# Patient Record
Sex: MALE | URBAN - METROPOLITAN AREA
[De-identification: names, ages, dates, MRNs, and addresses within clinical notes are randomized per-mention and may not be internally consistent; named-entity substitution may affect disease eponyms.]

---

## 2021-01-21 PROBLEM — K43.9 VENTRAL HERNIA WITHOUT OBSTRUCTION OR GANGRENE: Status: ACTIVE | Noted: 2021-01-21

## 2022-12-02 ENCOUNTER — TELEPHONE (OUTPATIENT)
Dept: SURGERY | Age: 67
End: 2022-12-02

## 2022-12-14 ENCOUNTER — OFFICE VISIT (OUTPATIENT)
Dept: SURGERY | Age: 67
End: 2022-12-14
Payer: COMMERCIAL

## 2022-12-14 VITALS
DIASTOLIC BLOOD PRESSURE: 77 MMHG | SYSTOLIC BLOOD PRESSURE: 122 MMHG | HEART RATE: 69 BPM | BODY MASS INDEX: 34.18 KG/M2 | HEIGHT: 67 IN | WEIGHT: 217.8 LBS | TEMPERATURE: 98.2 F | OXYGEN SATURATION: 96 %

## 2022-12-14 DIAGNOSIS — L98.9 FACIAL SKIN LESION: Primary | ICD-10-CM

## 2022-12-14 PROCEDURE — 99204 OFFICE O/P NEW MOD 45 MIN: CPT

## 2022-12-14 PROCEDURE — 3017F COLORECTAL CA SCREEN DOC REV: CPT

## 2022-12-14 PROCEDURE — G8484 FLU IMMUNIZE NO ADMIN: HCPCS

## 2022-12-14 PROCEDURE — 1123F ACP DISCUSS/DSCN MKR DOCD: CPT

## 2022-12-14 PROCEDURE — 1036F TOBACCO NON-USER: CPT

## 2022-12-14 PROCEDURE — G8417 CALC BMI ABV UP PARAM F/U: HCPCS

## 2022-12-14 PROCEDURE — G8427 DOCREV CUR MEDS BY ELIG CLIN: HCPCS

## 2022-12-14 NOTE — PROGRESS NOTES
[facility]    CC: Skin lesions    Referring Physician: Dr. Ron Ruiz    HPI: This is an 78 y.o.male with a PMHx as delineated below who presents to clinic in consultation for skin lesion on right neck. The patient noticed the lesion for approximately 2-3 weeks. He notes that it has been tender at times. It has grown in size. He wishes to have this removed. He has a history of BCC of nose that was removed over 20 years ago. Plastic surgery was consulted for evaluation and treatment.     PMHx:   Past Medical History:   Diagnosis Date    Diabetes mellitus ([facility] [address]      Last A1C 8.0 4/23/21    PSHx:   Past Surgical History:   Procedure Laterality Date    ABDOMEN SURGERY      CHOLECYSTECTOMY       Allergy: No Known Allergies    SHx:   Social History     Socioeconomic History    Marital status: Unknown     Spouse name: Not on file    Number of children: Not on file    Years of education: Not on file    Highest education level: Not on file   Occupational History    Not on file   Tobacco Use    Smoking status: Never    Smokeless tobacco: Never   Substance and Sexual Activity    Alcohol use: Not Currently    Drug use: Never    Sexual activity: Not on file   Other Topics Concern    Not on file   Social History Narrative    Not on file     Social Determinants of Health     Financial Resource Strain: Not on file   Food Insecurity: Not on file   Transportation Needs: Not on file   Physical Activity: Not on file   Stress: Not on file   Social Connections: Not on file   Intimate Partner Violence: Not on file   Housing Stability: Not on file     FHx: Family history of skin CA: none  Meds:   Current Outpatient Medications   Medication Sig Dispense Refill    metFORMIN (GLUCOPHAGE) 1000 MG tablet Take 1,000 mg by mouth 2 times daily (with meals)      Cholecalciferol 250 MCG (48763 UT) TABS Take 1 tablet by mouth daily      Cyanocobalamin 1000 MCG/15ML LIQD Take 15 mLs by mouth daily       No current facility-administered medications for this visit. ROS   Constitutional: Negative for chills and fever. HENT: Negative for congestion, facial swelling, and voice change. Eyes: Negative for photophobia and visual disturbance. Respiratory: Negative for apnea, cough, chest tightness and shortness of breath. Cardiovascular: Negative for chest pain and palpitations. Gastrointestinal: Negative for dysphagia and early satiety. Genitourinary: Negative for difficulty urinating, dysuria, flank pain, frequency and hematuria. Musculoskeletal: Negative for new gait problem, joint swelling and myalgias. Skin: Negative for color change, pallor and rash. Endocrine: negative for tremors, temperature intolerance or polydipsia. Allergic/Immunologic: Negative for new environmental or food allergies. Neurological: Negative for dizziness, seizures, speech difficulty, numbness. Hematological: Negative for adenopathy. Psychiatric/Behavioral: Negative for agitation and confusion. EXAM     /77   Pulse 69   Temp 98.2 °F (36.8 °C)   Ht 5' 7\" (1.702 m)   Wt 217 lb 12.8 oz (98.8 kg)   SpO2 96%   BMI 34.11 kg/m²     GEN: NAD  NECK: Supple with trachea in midline, Soft mobile mass 1.5 cm X 1.5 cm     PATHOLOGY/WORKUP: none    IMP: 67 y.o.male with neck lesion. PLAN: Will submit to insurance and work to schedule under [facility]. A discussion regarding surgical options including: neck lesion excision was performed with the patient. The pathophysiology of neck lesion was also elucidated specifying need for resection, observation, & margins. Clinical photos were obtained. Additionally, discussion regarding the risks including, but not limited to: bleeding (potentially requiring transfusion or reoperation), infection, seroma, reoperation, poor cosmetic outcome, scarring, possible facial nerve injury revisional surgery, diminished sensation, VTE (DVT/PE), and death was performed.   All questions were answered in a satisfactory manner. The patient was counseled at length about the risks of ellie Covid-19 during their perioperative period and any recovery window from their procedure. The patient was made aware that ellie Covid-19  may worsen their prognosis for recovering from their procedure  and lend to a higher morbidity and/or mortality risk. All material risks, benefits, and reasonable alternatives including postponing the procedure were discussed. The patient does wish to proceed with the procedure at this time.     Emily Velazco, APRN - [id number]  [phone]  12/14/22

## 2022-12-19 ENCOUNTER — TELEPHONE (OUTPATIENT)
Dept: SURGERY | Age: 67
End: 2022-12-19

## 2022-12-20 NOTE — TELEPHONE ENCOUNTER
I returned the patients call mentioned below. The patient was offered a surgery date of 1-, but stated that he is going to be [place] out of [place] and is not sure that he can commit to that. The patient will decide how or if he would like to move forward and call me back. I will leave this phone note open.

## 2023-01-03 NOTE — TELEPHONE ENCOUNTER
I lmom for the patient at the home number listed. I requested a call back to follow up on his move and possibly scheduling surgery. I will leave this phone note open.

## 2023-01-06 NOTE — TELEPHONE ENCOUNTER
I returned the patients call mentioned below. The patient was offered a surgery date at [facility] on 2-3-2023, but stated that he is moving tomorrow. I advised the patient to please reach out to us if we can be helpful in the future, but for now I will mail the medical records to the following address :  [address] [facility], [address] [place] [address]    I will remove the surgery letter from the letter queue. I will close this phone note.

## 2023-02-24 PROBLEM — N23 RENAL COLIC: Status: ACTIVE | Noted: 2023-02-24

## 2023-02-24 PROBLEM — R31.29 MICROSCOPIC HEMATURIA: Status: ACTIVE | Noted: 2023-02-24

## 2023-02-24 PROBLEM — N20.1 CALCULUS OF URETER: Status: ACTIVE | Noted: 2023-02-24

## 2023-04-11 ENCOUNTER — OFFICE VISIT (OUTPATIENT)
Dept: URGENT CARE | Facility: CLINIC | Age: 68
End: 2023-04-11
Payer: MEDICARE

## 2023-04-11 VITALS
WEIGHT: 214 LBS | RESPIRATION RATE: 17 BRPM | DIASTOLIC BLOOD PRESSURE: 83 MMHG | BODY MASS INDEX: 33.59 KG/M2 | OXYGEN SATURATION: 97 % | HEIGHT: 67 IN | TEMPERATURE: 98 F | SYSTOLIC BLOOD PRESSURE: 134 MMHG | HEART RATE: 76 BPM

## 2023-04-11 DIAGNOSIS — M79.672 LEFT FOOT PAIN: Primary | ICD-10-CM

## 2023-04-11 PROCEDURE — 73630 XR FOOT COMPLETE 3 VIEW LEFT: ICD-10-PCS | Mod: LT,S$GLB,, | Performed by: RADIOLOGY

## 2023-04-11 PROCEDURE — 99204 OFFICE O/P NEW MOD 45 MIN: CPT | Mod: S$GLB,,,

## 2023-04-11 PROCEDURE — 99204 PR OFFICE/OUTPT VISIT, NEW, LEVL IV, 45-59 MIN: ICD-10-PCS | Mod: S$GLB,,,

## 2023-04-11 PROCEDURE — 73630 X-RAY EXAM OF FOOT: CPT | Mod: LT,S$GLB,, | Performed by: RADIOLOGY

## 2023-04-11 RX ORDER — MELOXICAM 15 MG/1
15 TABLET ORAL DAILY
Qty: 5 TABLET | Refills: 0 | Status: SHIPPED | OUTPATIENT
Start: 2023-04-11 | End: 2023-04-16

## 2023-04-12 NOTE — PROGRESS NOTES
"Subjective:      Patient ID: Stef Guido is a 67 y.o. male.    Vitals:  height is 5' 7" (1.702 m) and weight is 97.1 kg (214 lb). His oral temperature is 98 °F (36.7 °C). His blood pressure is 134/83 and his pulse is 76. His respiration is 17 and oxygen saturation is 97%.     Chief Complaint: Foot Pain    Pt states the bottom of his left foot is very painful.    Foot Pain  This is a new problem. The current episode started yesterday. The problem occurs constantly. The problem has been gradually worsening. Associated symptoms include arthralgias. Pertinent negatives include no joint swelling, myalgias or numbness. Associated symptoms comments: No injury known. The symptoms are aggravated by walking. He has tried nothing for the symptoms.     Musculoskeletal:  Positive for pain, joint pain and pain with walking. Negative for trauma, joint swelling, arthritis, gout (history of gout), muscle cramps and muscle ache.   Neurological:  Negative for numbness.    Objective:     Physical Exam   Constitutional: He is oriented to person, place, and time. He appears well-developed. He is cooperative.   HENT:   Head: Normocephalic and atraumatic.   Ears:   Right Ear: Hearing, tympanic membrane, external ear and ear canal normal.   Left Ear: Hearing, tympanic membrane, external ear and ear canal normal.   Nose: Nose normal. No mucosal edema or nasal deformity. No epistaxis. Right sinus exhibits no maxillary sinus tenderness and no frontal sinus tenderness. Left sinus exhibits no maxillary sinus tenderness and no frontal sinus tenderness.   Mouth/Throat: Uvula is midline, oropharynx is clear and moist and mucous membranes are normal. No trismus in the jaw. Normal dentition. No uvula swelling.   Eyes: Conjunctivae and lids are normal.   Neck: Trachea normal and phonation normal. Neck supple.   Cardiovascular: Normal rate, regular rhythm, normal heart sounds and normal pulses.   Pulmonary/Chest: Effort normal and breath sounds " normal.   Abdominal: Normal appearance and bowel sounds are normal. Soft.   Musculoskeletal: Normal range of motion.         General: Normal range of motion.   Neurological: He is alert and oriented to person, place, and time. He exhibits normal muscle tone.   Skin: Skin is warm, dry and intact.   Psychiatric: His speech is normal and behavior is normal. Judgment and thought content normal.   Nursing note and vitals reviewed.    Assessment:     1. Left foot pain        Plan:       Left foot pain  -     XR FOOT COMPLETE 3 VIEW LEFT; Future; Expected date: 04/11/2023      Presents with left foot pain that began yesterday.  He denies any known trauma, missteps or falls. He states it feels like he has a pebble under the skin at the base of his toe. He has significant tenderness with palpation to the base of his 3rd toe on the pad of his foot.  There is no noted increased redness, swelling nor puncture wounds.        Patient presenting with pain of the bottom of left foot following no known trauma.  On evaluation there was significant bony tenderness.  Xrays were obtained.  Radiologist reviewed films and states xrays to show - FINDINGS:  There is focal soft tissue prominent seen at the plantar aspect of the forefoot deep to the metatarsals.  Three views of the left foot demonstrate no acute fracture or dislocation.  A small Achilles and a tiny plantar spurs are present. Patient discharged with prescription for Meloxicam. Instructed to use tylenol, elevation, and ice as needed for pain.  Follow up with Orthopedic next available appointment. Referral provided. Seek medical attention immediately for severe pain, concern for infection, or any other concerns.  Follow up with your PCP for continued care.      Follow up with PCP for possible rascon's neuroma vs a bone spur.       Discussed POSITIVE test results and plan of care with patient.  They voiced full understanding and are in agreement with the current plan of care.   All known questions and concerns addressed at this time.      You must understand that you have received treatment at an Urgent Care Facility only, and that you may be released before all of your medical problems are known or treated.  Urgent Care facilities are not equipped to handle life threatening emergencies.  It is recommended that you seek care at an Emergency Department for further evaluation of worsening or concerning symptoms, or possibly life threatening conditions as discussed.

## 2025-08-05 ENCOUNTER — OFFICE VISIT (OUTPATIENT)
Dept: URGENT CARE | Facility: CLINIC | Age: 70
End: 2025-08-05
Payer: MEDICARE

## 2025-08-05 VITALS
DIASTOLIC BLOOD PRESSURE: 74 MMHG | HEIGHT: 68 IN | TEMPERATURE: 98 F | RESPIRATION RATE: 20 BRPM | WEIGHT: 212 LBS | OXYGEN SATURATION: 98 % | HEART RATE: 74 BPM | SYSTOLIC BLOOD PRESSURE: 108 MMHG | BODY MASS INDEX: 32.13 KG/M2

## 2025-08-05 DIAGNOSIS — J01.40 ACUTE PANSINUSITIS, RECURRENCE NOT SPECIFIED: Primary | ICD-10-CM

## 2025-08-05 DIAGNOSIS — H66.003 ACUTE SUPPURATIVE OTITIS MEDIA OF BOTH EARS WITHOUT SPONTANEOUS RUPTURE OF TYMPANIC MEMBRANES, RECURRENCE NOT SPECIFIED: ICD-10-CM

## 2025-08-05 LAB
CTP QC/QA: YES
SARS-COV+SARS-COV-2 AG RESP QL IA.RAPID: NEGATIVE

## 2025-08-05 PROCEDURE — 99213 OFFICE O/P EST LOW 20 MIN: CPT | Mod: S$GLB,,, | Performed by: NURSE PRACTITIONER

## 2025-08-05 PROCEDURE — 87811 SARS-COV-2 COVID19 W/OPTIC: CPT | Mod: QW,S$GLB,, | Performed by: NURSE PRACTITIONER

## 2025-08-05 RX ORDER — PREDNISONE 20 MG/1
20 TABLET ORAL DAILY
Qty: 3 TABLET | Refills: 0 | Status: SHIPPED | OUTPATIENT
Start: 2025-08-05 | End: 2025-08-08

## 2025-08-05 RX ORDER — AZELASTINE 1 MG/ML
2 SPRAY, METERED NASAL 2 TIMES DAILY
Qty: 30 ML | Refills: 0 | Status: SHIPPED | OUTPATIENT
Start: 2025-08-05 | End: 2025-08-10

## 2025-08-05 RX ORDER — BENZONATATE 200 MG/1
200 CAPSULE ORAL 3 TIMES DAILY PRN
Qty: 30 CAPSULE | Refills: 0 | Status: CANCELLED | OUTPATIENT
Start: 2025-08-05

## 2025-08-05 RX ORDER — AMOXICILLIN AND CLAVULANATE POTASSIUM 875; 125 MG/1; MG/1
1 TABLET, FILM COATED ORAL 2 TIMES DAILY
Qty: 14 TABLET | Refills: 0 | Status: SHIPPED | OUTPATIENT
Start: 2025-08-05 | End: 2025-08-12

## 2025-08-05 NOTE — PROGRESS NOTES
"Subjective:      Patient ID: Stef Guido is a 70 y.o. male.    Vitals:  height is 5' 8" (1.727 m) and weight is 96.2 kg (212 lb). His oral temperature is 97.9 °F (36.6 °C). His blood pressure is 108/74 and his pulse is 74. His respiration is 20 and oxygen saturation is 98%.     Chief Complaint: Sinus Problem    71 y/o male presents to the clinic with sinus sx that started 4 days ago. Tx with generic sudafed. No relief.     Sinus Problem  This is a new problem. Episode onset: 4 days. The problem has been gradually worsening since onset. There has been no fever. His pain is at a severity of 6/10 (10/10 last night). The pain is moderate. Associated symptoms include congestion, coughing, headaches, sinus pressure and sneezing. Pertinent negatives include no ear pain, neck pain, shortness of breath, sore throat or swollen glands. (Ear congestion, fatigue   ) Treatments tried: otc sudadfed. The treatment provided no relief.       HENT:  Positive for congestion and sinus pressure. Negative for ear pain and sore throat.    Neck: Negative for neck pain.   Respiratory:  Positive for cough. Negative for shortness of breath.    Allergic/Immunologic: Positive for sneezing.   Neurological:  Positive for headaches.      Objective:     Physical Exam   Constitutional: He is oriented to person, place, and time. He appears well-developed. He is cooperative.  Non-toxic appearance. He appears ill. No distress.   HENT:   Head: Normocephalic and atraumatic.   Ears:   Right Ear: External ear and ear canal normal. Tympanic membrane is bulging.   Left Ear: External ear and ear canal normal. Tympanic membrane is bulging.   Nose: Mucosal edema and congestion present. No nasal deformity. No epistaxis. Right sinus exhibits maxillary sinus tenderness and frontal sinus tenderness. Left sinus exhibits maxillary sinus tenderness and frontal sinus tenderness.   Mouth/Throat: Uvula is midline, oropharynx is clear and moist and mucous membranes are " normal. No trismus in the jaw. Normal dentition. No uvula swelling. No oropharyngeal exudate, posterior oropharyngeal edema or posterior oropharyngeal erythema.   Eyes: Conjunctivae and lids are normal. No scleral icterus.   Neck: Trachea normal and phonation normal. Neck supple. No edema present. No erythema present. No neck rigidity present.   Cardiovascular: Normal rate, regular rhythm, normal heart sounds and normal pulses.   Pulmonary/Chest: Effort normal and breath sounds normal. No respiratory distress. He has no decreased breath sounds. He has no rhonchi.   Abdominal: Normal appearance.   Musculoskeletal: Normal range of motion.         General: No deformity. Normal range of motion.   Neurological: He is alert and oriented to person, place, and time. He exhibits normal muscle tone. Coordination normal.   Skin: Skin is warm, dry, intact, not diaphoretic and not pale.   Psychiatric: His speech is normal and behavior is normal. Judgment and thought content normal.   Nursing note and vitals reviewed.      Assessment:     1. Acute pansinusitis, recurrence not specified    2. Acute suppurative otitis media of both ears without spontaneous rupture of tympanic membranes, recurrence not specified      Results for orders placed or performed in visit on 08/05/25   SARS Coronavirus 2 Antigen, POCT Manual Read    Collection Time: 08/05/25  4:52 PM   Result Value Ref Range    SARS Coronavirus 2 Antigen Negative Negative, Presumptive Negative     Acceptable Yes       Plan:       Acute pansinusitis, recurrence not specified  -     SARS Coronavirus 2 Antigen, POCT Manual Read  -     amoxicillin-clavulanate 875-125mg (AUGMENTIN) 875-125 mg per tablet; Take 1 tablet by mouth 2 (two) times daily. for 7 days  Dispense: 14 tablet; Refill: 0  -     azelastine (ASTELIN) 137 mcg (0.1 %) nasal spray; 2 sprays (274 mcg total) by Nasal route 2 (two) times daily. for 5 days  Dispense: 30 mL; Refill: 0  -     predniSONE  (DELTASONE) 20 MG tablet; Take 1 tablet (20 mg total) by mouth once daily. for 3 days  Dispense: 3 tablet; Refill: 0    Acute suppurative otitis media of both ears without spontaneous rupture of tympanic membranes, recurrence not specified  -     SARS Coronavirus 2 Antigen, POCT Manual Read  -     amoxicillin-clavulanate 875-125mg (AUGMENTIN) 875-125 mg per tablet; Take 1 tablet by mouth 2 (two) times daily. for 7 days  Dispense: 14 tablet; Refill: 0